# Patient Record
Sex: MALE | Race: WHITE | NOT HISPANIC OR LATINO | Employment: UNEMPLOYED | URBAN - METROPOLITAN AREA
[De-identification: names, ages, dates, MRNs, and addresses within clinical notes are randomized per-mention and may not be internally consistent; named-entity substitution may affect disease eponyms.]

---

## 2021-06-01 ENCOUNTER — RECORDS - HEALTHEAST (OUTPATIENT)
Dept: ADMINISTRATIVE | Facility: CLINIC | Age: 36
End: 2021-06-01

## 2021-06-02 ENCOUNTER — RECORDS - HEALTHEAST (OUTPATIENT)
Dept: ADMINISTRATIVE | Facility: CLINIC | Age: 36
End: 2021-06-02

## 2021-07-13 ENCOUNTER — TRANSFERRED RECORDS (OUTPATIENT)
Dept: HEALTH INFORMATION MANAGEMENT | Facility: CLINIC | Age: 36
End: 2021-07-13

## 2021-07-14 ENCOUNTER — TRANSFERRED RECORDS (OUTPATIENT)
Dept: HEALTH INFORMATION MANAGEMENT | Facility: CLINIC | Age: 36
End: 2021-07-14

## 2021-08-10 ENCOUNTER — TELEPHONE (OUTPATIENT)
Dept: SURGERY | Facility: CLINIC | Age: 36
End: 2021-08-10

## 2021-08-10 ENCOUNTER — OFFICE VISIT (OUTPATIENT)
Dept: SURGERY | Facility: CLINIC | Age: 36
End: 2021-08-10
Payer: COMMERCIAL

## 2021-08-10 VITALS — TEMPERATURE: 97.3 F | DIASTOLIC BLOOD PRESSURE: 88 MMHG | SYSTOLIC BLOOD PRESSURE: 144 MMHG

## 2021-08-10 DIAGNOSIS — K40.20 NON-RECURRENT BILATERAL INGUINAL HERNIA WITHOUT OBSTRUCTION OR GANGRENE: Primary | ICD-10-CM

## 2021-08-10 PROCEDURE — 99204 OFFICE O/P NEW MOD 45 MIN: CPT | Performed by: SURGERY

## 2021-08-10 RX ORDER — IBUPROFEN 200 MG
200 TABLET ORAL EVERY 4 HOURS PRN
COMMUNITY

## 2021-08-10 NOTE — PROGRESS NOTES
Assessment & Plan   Problem List Items Addressed This Visit     None      Visit Diagnoses     Non-recurrent bilateral inguinal hernia without obstruction or gangrene    -  Primary    Relevant Medications    ibuprofen (ADVIL/MOTRIN) 200 MG tablet    Other Relevant Orders    Case Request: robotic assisted inguinal hernia repair, bilateral; possible open           The patient was thoroughly counseled regarding Non-recurrent bilateral inguinal hernia without obstruction or gangrene [K40.20].     The patient was informed that the proposed procedure or medical intervention involves reduction and repair with or without mesh and does offer a very good likelihood of symptom relief. We also discussed the options of repairing the hernia laparoscopically, robotically, versus open. Patient opted for robotic repair.      The patient was made aware of the risks of the procedure, including but not limited to:  nerve entrapment or injury, persistence of pain (10%), injury to the bowel/bladder, infertility, ischemic orchitis (rare), Injury to the vas deferens (rare), hematoma, mesh migration, mesh infection, cardiac or pulmonary complication and anesthesia related complications also that difficulties may be encountered during recovery to include: wound infection, recurrence (5-10%), seroma, hematoma and chronic pain.     In the course of the evaluation we did discuss other therapeutic options with the patient, including continued watchful waiting. The risks and benefits of these options were also discussed which include but are not limited to: incarceration and/or strangulation..     Also discussed were possible problems or difficulties the patient may encounter if treatment was not pursued at this time.     The patient was informed that [facility] MD [place] will be primarily responsible for the procedure. Assistance during the procedure and during hospitalization may also be provided by other physicians, nurses and technicians.      The patient will be provided additional education resources by the support staff. If there are ever any questions regarding their diagnosis or the procedure, the patient is encouraged to ask.     All of the patient s or their legal representative s questions have been answered to their satisfaction and they have indicated a clear understanding of this discussion.   Kwasi expressed understanding of risks, benefits and alternatives and wished to proceed.     All findings, test results, and diagnosis were discussed with the patient. Kwasi participated in the decision making process and agreed with the plan of care. Questions were sought and answered.     No follow-ups on file.    Shilpa Ibrahim MD  [facility]    Estrellita Villalpando is a 35 year old who presents for the following health issues:    Right groin - large bulge; first noted a few months ago.  Been incarcerated for 8 months now.  Reducible but will come right back out.  Not sure of a particular event that got this hernia.  +painful all the time.  Throbbing pain especially when he's not laying supine.  Passing gas; having BMs.  No urinating issues.  Cannot feel a bulge on the left groin but feel like there may be another hernia on that side too.  No hx of hernia repair in the past.      No MI; no CVA.  Never been a smoker.  No other abdominal surgeries.             Review of Systems   Constitutional, HEENT, cardiovascular, pulmonary, GI, , musculoskeletal, neuro, skin, endocrine and psych systems are negative, except as otherwise noted.      Objective    BP (!) 144/88   Temp 97.3  F (36.3  C) (Temporal)   There is no height or weight on file to calculate BMI.  Physical Exam  Vitals reviewed.   HENT:      Head: Normocephalic.   Eyes:      Conjunctiva/sclera: Conjunctivae normal.   Cardiovascular:      Pulses: Normal pulses.   Pulmonary:      Effort: Pulmonary effort is normal.   Abdominal:      Hernia: A hernia is present. Hernia  is present in the left inguinal area and right inguinal area.       Musculoskeletal:      Cervical back: Normal range of motion.   Neurological:      Mental Status: He is alert.

## 2021-08-11 NOTE — TELEPHONE ENCOUNTER
Current inmate.  Please fax surgery order and 8/10 office notes to [facility]. Attn Lissette fax # [phone]    They will then get approval and call when ready to schedule surgery.

## 2021-08-11 NOTE — TELEPHONE ENCOUNTER
Faxed a copy of the appointment notes and order from 8/10/21 to Chet Mclaughlin at [phone].    Devika Domínguez on 8/11/2021 at 11:21 AM

## 2021-08-12 ENCOUNTER — HOSPITAL ENCOUNTER (OUTPATIENT)
Facility: CLINIC | Age: 36
End: 2021-08-12
Attending: SURGERY | Admitting: SURGERY
Payer: COMMERCIAL

## 2021-08-12 DIAGNOSIS — Z11.59 ENCOUNTER FOR SCREENING FOR OTHER VIRAL DISEASES: ICD-10-CM

## 2021-08-12 DIAGNOSIS — K40.20 NON-RECURRENT BILATERAL INGUINAL HERNIA WITHOUT OBSTRUCTION OR GANGRENE: ICD-10-CM

## 2021-08-12 NOTE — TELEPHONE ENCOUNTER
Type of surgery: robotic assisted inguinal hernia repair, bilateral; possible open (Bilateral)   Location of surgery: [facility]  Date and time of surgery: 9/2  Surgeon: [place]  Pre-Op Appt Date: breanne will do  Post-Op Appt Date: 9/15   Packet sent out: No  Pre-cert/Authorization completed:  Not Applicable  Date: annmarie Major from [facility] scheduled.  She will fax our OR preop and covid results. She was given fax #

## 2021-08-15 ENCOUNTER — TRANSFERRED RECORDS (OUTPATIENT)
Dept: HEALTH INFORMATION MANAGEMENT | Facility: CLINIC | Age: 36
End: 2021-08-15

## 2021-08-15 LAB
ALT SERPL-CCNC: 23 U/L (ref 9–46)
AST SERPL-CCNC: 20 U/L (ref 10–40)
CREATININE (EXTERNAL): 0.92 MG/DL (ref 0.6–1.35)
GFR ESTIMATED (EXTERNAL): 107 ML/MIN/1.73M2
GFR ESTIMATED (IF AFRICAN AMERICAN) (EXTERNAL): 124 ML/MIN/1.73M2
GLUCOSE (EXTERNAL): 84 MG/DL (ref 65–99)
POTASSIUM (EXTERNAL): 4.5 MMOL/L (ref 3.5–5.3)

## 2021-08-24 NOTE — TELEPHONE ENCOUNTER
See below. Cancel 9/2 surgery.    Will wait for patient to call and reschedule as we have no contact info for him

## 2022-02-15 NOTE — TELEPHONE ENCOUNTER
Patient would like to get hernia surgery rescheduled. Does he need to be seen again first?  If not, please place case request.   They would like surgery end of March.     Will call back [facility] to schedule. Ladan Nj [phone]

## 2022-05-19 ENCOUNTER — TELEPHONE (OUTPATIENT)
Dept: SURGERY | Facility: CLINIC | Age: 37
End: 2022-05-19

## 2022-05-19 NOTE — TELEPHONE ENCOUNTER
Reason for Call:  Other call back    Detailed comments: Patient was seen 8-10-21. Kristina from [facility] is requesting to schedule surgery. Orders from August 2021 have been cancelled.  The [facility] would like to schedule surgery for this inmate. Please call Kristina to advise if new orders will go thru or if patient needs to be seen again.    Phone Number Patient can be reached at: Other phone number:  [phone]  Kristina    Best Time: any    Can we leave a detailed message on this number? YES    Call taken on 5/19/2022 at 2:55 PM by Raiza Garcia

## 2022-08-12 ENCOUNTER — LAB REQUISITION (OUTPATIENT)
Dept: LAB | Facility: CLINIC | Age: 37
End: 2022-08-12

## 2022-08-12 LAB
ERYTHROCYTE [DISTWIDTH] IN BLOOD BY AUTOMATED COUNT: 11.9 % (ref 10–15)
HCT VFR BLD AUTO: 46.9 % (ref 40–53)
HGB BLD-MCNC: 15.9 G/DL (ref 13.3–17.7)
INR PPP: 0.98 (ref 0.85–1.15)
MCH RBC QN AUTO: 32.9 PG (ref 26.5–33)
MCHC RBC AUTO-ENTMCNC: 33.9 G/DL (ref 31.5–36.5)
MCV RBC AUTO: 97 FL (ref 78–100)
PLATELET # BLD AUTO: 376 10E3/UL (ref 150–450)
RBC # BLD AUTO: 4.84 10E6/UL (ref 4.4–5.9)
WBC # BLD AUTO: 8.8 10E3/UL (ref 4–11)

## 2022-08-12 PROCEDURE — 85014 HEMATOCRIT: CPT | Performed by: NURSE PRACTITIONER

## 2022-08-12 PROCEDURE — 85610 PROTHROMBIN TIME: CPT | Performed by: NURSE PRACTITIONER

## 2024-11-14 ENCOUNTER — HOSPITAL ENCOUNTER (EMERGENCY)
Facility: HOSPITAL | Age: 39
Discharge: HOME | End: 2024-11-14
Attending: EMERGENCY MEDICINE
Payer: COMMERCIAL

## 2024-11-14 VITALS
DIASTOLIC BLOOD PRESSURE: 86 MMHG | HEIGHT: 71 IN | OXYGEN SATURATION: 97 % | HEART RATE: 106 BPM | TEMPERATURE: 98.1 F | BODY MASS INDEX: 29.26 KG/M2 | WEIGHT: 209 LBS | SYSTOLIC BLOOD PRESSURE: 146 MMHG | RESPIRATION RATE: 18 BRPM

## 2024-11-14 DIAGNOSIS — G43.909 MIGRAINE WITHOUT STATUS MIGRAINOSUS, NOT INTRACTABLE, UNSPECIFIED MIGRAINE TYPE: ICD-10-CM

## 2024-11-14 PROCEDURE — 99283 EMERGENCY DEPT VISIT LOW MDM: CPT

## 2024-11-14 RX ORDER — SUMATRIPTAN 50 MG/1
50 TABLET, FILM COATED ORAL
Qty: 20 TABLET | Refills: 0 | Status: SHIPPED | OUTPATIENT
Start: 2024-11-14 | End: 2024-11-14

## 2024-11-14 RX ORDER — SUMATRIPTAN 50 MG/1
50 TABLET, FILM COATED ORAL
Qty: 20 TABLET | Refills: 0 | Status: SHIPPED | OUTPATIENT
Start: 2024-11-14

## 2024-11-14 RX ORDER — KETOROLAC TROMETHAMINE 15 MG/ML
15 INJECTION, SOLUTION INTRAMUSCULAR; INTRAVENOUS ONCE
Status: COMPLETED | OUTPATIENT
Start: 2024-11-14 | End: 2024-11-14

## 2024-11-14 RX ORDER — DIPHENHYDRAMINE HYDROCHLORIDE 50 MG/ML
25 INJECTION INTRAMUSCULAR; INTRAVENOUS ONCE
Status: COMPLETED | OUTPATIENT
Start: 2024-11-14 | End: 2024-11-14

## 2024-11-14 RX ORDER — METOCLOPRAMIDE HYDROCHLORIDE 5 MG/ML
5 INJECTION INTRAMUSCULAR; INTRAVENOUS ONCE
Status: COMPLETED | OUTPATIENT
Start: 2024-11-14 | End: 2024-11-14

## 2024-11-14 ASSESSMENT — COLUMBIA-SUICIDE SEVERITY RATING SCALE - C-SSRS
1. IN THE PAST MONTH, HAVE YOU WISHED YOU WERE DEAD OR WISHED YOU COULD GO TO SLEEP AND NOT WAKE UP?: NO
6. HAVE YOU EVER DONE ANYTHING, STARTED TO DO ANYTHING, OR PREPARED TO DO ANYTHING TO END YOUR LIFE?: NO
2. HAVE YOU ACTUALLY HAD ANY THOUGHTS OF KILLING YOURSELF IN THE PAST MONTH?: NO

## 2024-11-14 NOTE — ED TRIAGE NOTES
X3 days migraine, intermittent blurred vision. Vision clear now. Normally takes imitrex, but unable to fill so has not had any for some time. Also reports nausea.

## 2024-11-14 NOTE — ED PROVIDER NOTES
EMERGENCY DEPARTMENT ENCOUNTER      NAME: Kwasi Valente  AGE: 39 year old male  YOB: 1985  MRN: [id number]  EVALUATION DATE & TIME: No admission date for patient encounter.    PCP: No Ref-Primary, Physician    ED PROVIDER: Sherlyn Schwartz MD      Chief Complaint   Patient presents with    Headache         FINAL IMPRESSION:  1. Migraine without status migrainosus, not intractable, unspecified migraine type          ED COURSE & MEDICAL DECISION MAKING:    Pertinent Labs & Imaging studies reviewed. (See chart for details)    3:10 PM I introduced myself to the patient, obtained patient history, performed a physical exam, and discussed plan for ED workup including potential diagnostic laboratory/imaging studies and interventions.    39 year old male with history of migraine headaches who presents to the Emergency Department for evaluation of headache.  Patient reports that his headache is typical of his migraines.  States he is out of his Imitrex.  Denies any recent trauma to the head.  Has no focal neurologic deficits.  Overall felt that intracranial hemorrhage was less likely as he reports his headache is very typical of his chronic migraines and he has no focal neurologic deficit.  Reports some intermittent blurred vision but also reports that this is typical of his migraines and thus felt that primary intraocular etiologies such as glaucoma much less likely.  Vision is also normal currently.  No focal deficits to suggest CVA.  No temporal headache to suggest temporal arteritis.  No meningismus to suggest meningitis.  He is afebrile here.  Discussed migraine cocktail and reevaluation.  Unfortunately was being seen in the waiting room due to severe capacity issues in the ER.  I was notified by the waiting room nurse that the patient had decided that he would like to be discharged and declined medication.  I did write for refill of his Imitrex.  Given indications for return emergency department.  He  was discharged as requested in stable condition.    ED Course as of 11/14/24 1609   Thu Nov 14, 2024   1601 Nurse contacted me that he is refusing the medication and would like to be discharged at this time.       At the conclusion of the encounter I discussed the results of all of the tests and the disposition. The questions were answered. The patient or family acknowledged understanding and was agreeable with the care plan.       Medical Decision Making  Obtained supplemental history:Supplemental history obtained?: No  Reviewed external records: External records reviewed?: Documented in chart  Care impacted by chronic illness:Documented in Chart  Care significantly affected by social determinants of health:Access to Medical Care  Did you consider but not order tests?: Work up considered but not performed and documented in chart, if applicable  Did you interpret images independently?: Independent interpretation of ECG and images noted in documentation, when applicable.  Consultation discussion with other provider:Did you involve another provider (consultant, , pharmacy, etc.)?: No  Discharge. I prescribed additional prescription strength medication(s) as charted. See documentation for any additional details.    Not Applicable      MEDICATIONS GIVEN IN THE EMERGENCY:  Medications   sodium chloride 0.9% BOLUS 500 mL (has no administration in time range)   metoclopramide (REGLAN) injection 5 mg (has no administration in time range)   diphenhydrAMINE (BENADRYL) injection 25 mg (has no administration in time range)   ketorolac (TORADOL) injection 15 mg (has no administration in time range)       NEW PRESCRIPTIONS STARTED AT TODAY'S ER VISIT  New Prescriptions    SUMATRIPTAN (IMITREX) 50 MG TABLET    Take 1 tablet (50 mg) by mouth at onset of headache for migraine. May repeat in 2 hours. Max 4 tablets/24 hours.          =================================================================    HPI    Patient information was  obtained from: Patient    Kwasi Valente is a 39 year old male with a pertinent history of migraine who presents to this ED for evaluation of headache.  Patient states he has been dealing with a headache typical of his migraines for the last couple of days.  He normally would take Imitrex but states he has not had his prescription for quite some time.  He states that his headache is behind his eyes which is typical of his migraines.  Intermittently having some blurred vision which she states is also typical of his migraines.  Having some associated nausea and photophobia.  Again typical of his migraines.  Denies any numbness, tingling, weakness.  States no current vision changes.  No double vision.  Denies any fevers, vomiting, diarrhea, chest pain, shortness of breath, or other complaints.  No recent falls or trauma.      REVIEW OF SYSTEMS   Pertinent positives and negatives are documented in the HPI. All other systems reviewed and are negative.      PAST MEDICAL HISTORY:  No past medical history on file.    PAST SURGICAL HISTORY:  Past Surgical History:   Procedure Laterality Date    APPENDECTOMY      ORTHOPEDIC SURGERY      Right hand    ORTHOPEDIC SURGERY      Left knee    OTHER SURGICAL HISTORY      right shoulder surgery           CURRENT MEDICATIONS:    SUMAtriptan (IMITREX) 50 MG tablet  EPINEPHrine (EPIPEN) 0.3 MG/0.3ML injection  ibuprofen (ADVIL/MOTRIN) 200 MG tablet  oxyCODONE-acetaminophen (PERCOCET) 5-325 MG per tablet  TRAMADOL HCL PO        ALLERGIES:  Allergies   Allergen Reactions    Bees Anaphylaxis    Penicillins Nausea and Vomiting    Hydrocodone Rash    Keflex [Cephalexin Hcl] Rash       FAMILY HISTORY:  No family history on file.    SOCIAL HISTORY:   Social History     Socioeconomic History    Marital status: Single   Tobacco Use    Smoking status: Never    Smokeless tobacco: Never   Substance and Sexual Activity    Alcohol use: No     Comment: Once/month    Drug use: No     Social Drivers of  "Health     Financial Resource Strain: Not At Risk (3/9/2023)    [facility]    Financial Resource Strain     Is it hard for you to pay for the very basics like food, housing, medical care or heating?: No   Food Insecurity: Not At Risk (3/9/2023)    [facility]    Food Insecurity     Does your food run out before you have the money to buy more?: No   Transportation Needs: Not At Risk (3/9/2023)    [facility]    Transportation Needs     Does a lack of transportation keep you from your medical appointments or from getting your medications?: No    Received from [facility]    Social [employer]       VITALS:  BP (!) 146/86   Pulse 106   Temp 98.1  F (36.7  C) (Temporal)   Resp 18   Ht 1.803 m (5' 11\")   Wt 94.8 kg (209 lb)   SpO2 97%   BMI 29.15 kg/m      PHYSICAL EXAM    Physical Exam  Constitutional: Well developed, Well nourished, NAD, GCS 15  HENT: Normocephalic, Atraumatic, Bilateral external ears normal, Oropharynx normal, mucous membranes moist, Nose normal. Neck- Normal range of motion, No tenderness, Supple, No stridor.    Eyes: PERRL, EOMI, Conjunctiva normal, No discharge.   Respiratory: Normal breath sounds, No respiratory distress, No wheezing or crackles, Speaks in full sentences easily.    Cardiovascular: Normal heart rate, Regular rhythm, No murmurs, No rubs, No gallops. 2+ radial pulses bilaterally  GI: Bowel sounds normal, Soft, No tenderness.   Musculoskeletal: 2+ DP pulses. Good range of motion in all major joints.   Integument: Warm, Dry, No erythema, No rash. No petechiae.  Neurologic: Alert & oriented x 3, 5/5 strength in all 4 extremities bilaterally. Sensation intact to light touch in all 4 extremities and the face bilaterally. No focal deficits noted. Normal gait. CN 2-12 intact. No visual field deficits. Speech is normal. Ambulating without difficulty.   Psychiatric: Cooperative.      LAB:  All " pertinent labs reviewed and interpreted.       RADIOLOGY:  Reviewed all pertinent imaging. Please see official radiology report.  No orders to display         Sherlyn Schwartz MD  [facility] EMERGENCY DEPARTMENT  [facility] [facility] [id number] [phone]       Sherlyn Schwartz MD  11/17/24 0906

## 2024-11-14 NOTE — DISCHARGE INSTRUCTIONS
Follow-up with primary care for further refills of your headache medication and to discuss your headaches.    Return to the ER for any new or worsening concerns.